# Patient Record
Sex: FEMALE | Race: WHITE
[De-identification: names, ages, dates, MRNs, and addresses within clinical notes are randomized per-mention and may not be internally consistent; named-entity substitution may affect disease eponyms.]

---

## 2017-03-30 ENCOUNTER — HOSPITAL ENCOUNTER (OUTPATIENT)
Dept: HOSPITAL 19 - LDRO | Age: 27
End: 2017-03-30
Attending: OBSTETRICS & GYNECOLOGY
Payer: COMMERCIAL

## 2017-03-30 DIAGNOSIS — Z53.9: Primary | ICD-10-CM

## 2017-05-27 ENCOUNTER — HOSPITAL ENCOUNTER (OUTPATIENT)
Dept: HOSPITAL 19 - LDRO | Age: 27
LOS: 1 days | Discharge: HOME | End: 2017-05-28
Attending: OBSTETRICS & GYNECOLOGY
Payer: COMMERCIAL

## 2017-05-27 VITALS — TEMPERATURE: 98.4 F | DIASTOLIC BLOOD PRESSURE: 85 MMHG | SYSTOLIC BLOOD PRESSURE: 120 MMHG | HEART RATE: 67 BPM

## 2017-05-27 VITALS — SYSTOLIC BLOOD PRESSURE: 120 MMHG | DIASTOLIC BLOOD PRESSURE: 85 MMHG | HEART RATE: 67 BPM | TEMPERATURE: 98.4 F

## 2017-05-27 VITALS — HEIGHT: 64.02 IN | WEIGHT: 153.22 LBS | BODY MASS INDEX: 26.16 KG/M2

## 2017-05-27 DIAGNOSIS — Z3A.31: ICD-10-CM

## 2017-05-28 ENCOUNTER — HOSPITAL ENCOUNTER (INPATIENT)
Dept: HOSPITAL 19 - LDRO | Age: 27
LOS: 2 days | Discharge: HOME | End: 2017-05-30
Attending: OBSTETRICS & GYNECOLOGY | Admitting: OBSTETRICS & GYNECOLOGY
Payer: COMMERCIAL

## 2017-05-28 VITALS — HEART RATE: 100 BPM | SYSTOLIC BLOOD PRESSURE: 125 MMHG | DIASTOLIC BLOOD PRESSURE: 77 MMHG

## 2017-05-28 VITALS — HEART RATE: 83 BPM | DIASTOLIC BLOOD PRESSURE: 76 MMHG | SYSTOLIC BLOOD PRESSURE: 131 MMHG

## 2017-05-28 VITALS — DIASTOLIC BLOOD PRESSURE: 57 MMHG | SYSTOLIC BLOOD PRESSURE: 118 MMHG | HEART RATE: 76 BPM

## 2017-05-28 VITALS — TEMPERATURE: 97.8 F | DIASTOLIC BLOOD PRESSURE: 70 MMHG | SYSTOLIC BLOOD PRESSURE: 114 MMHG | HEART RATE: 77 BPM

## 2017-05-28 VITALS — DIASTOLIC BLOOD PRESSURE: 60 MMHG | HEART RATE: 75 BPM | SYSTOLIC BLOOD PRESSURE: 126 MMHG

## 2017-05-28 VITALS — DIASTOLIC BLOOD PRESSURE: 77 MMHG | HEART RATE: 98 BPM | SYSTOLIC BLOOD PRESSURE: 129 MMHG

## 2017-05-28 VITALS — HEART RATE: 85 BPM | SYSTOLIC BLOOD PRESSURE: 140 MMHG | DIASTOLIC BLOOD PRESSURE: 95 MMHG

## 2017-05-28 VITALS — HEART RATE: 71 BPM | SYSTOLIC BLOOD PRESSURE: 128 MMHG | DIASTOLIC BLOOD PRESSURE: 60 MMHG

## 2017-05-28 VITALS — HEART RATE: 91 BPM | SYSTOLIC BLOOD PRESSURE: 117 MMHG | DIASTOLIC BLOOD PRESSURE: 75 MMHG

## 2017-05-28 VITALS — HEART RATE: 88 BPM | SYSTOLIC BLOOD PRESSURE: 107 MMHG | DIASTOLIC BLOOD PRESSURE: 65 MMHG

## 2017-05-28 VITALS — HEART RATE: 78 BPM | SYSTOLIC BLOOD PRESSURE: 129 MMHG | DIASTOLIC BLOOD PRESSURE: 79 MMHG

## 2017-05-28 VITALS — HEART RATE: 90 BPM | DIASTOLIC BLOOD PRESSURE: 72 MMHG | SYSTOLIC BLOOD PRESSURE: 119 MMHG

## 2017-05-28 VITALS — SYSTOLIC BLOOD PRESSURE: 122 MMHG | HEART RATE: 97 BPM | DIASTOLIC BLOOD PRESSURE: 73 MMHG

## 2017-05-28 VITALS — SYSTOLIC BLOOD PRESSURE: 107 MMHG | HEART RATE: 81 BPM | DIASTOLIC BLOOD PRESSURE: 60 MMHG

## 2017-05-28 VITALS — HEART RATE: 102 BPM | DIASTOLIC BLOOD PRESSURE: 81 MMHG | SYSTOLIC BLOOD PRESSURE: 130 MMHG

## 2017-05-28 VITALS — SYSTOLIC BLOOD PRESSURE: 135 MMHG | HEART RATE: 84 BPM | DIASTOLIC BLOOD PRESSURE: 58 MMHG

## 2017-05-28 VITALS — DIASTOLIC BLOOD PRESSURE: 58 MMHG | HEART RATE: 82 BPM | SYSTOLIC BLOOD PRESSURE: 116 MMHG

## 2017-05-28 VITALS — DIASTOLIC BLOOD PRESSURE: 73 MMHG | HEART RATE: 83 BPM | SYSTOLIC BLOOD PRESSURE: 124 MMHG | TEMPERATURE: 98 F

## 2017-05-28 VITALS — SYSTOLIC BLOOD PRESSURE: 122 MMHG | HEART RATE: 87 BPM | DIASTOLIC BLOOD PRESSURE: 71 MMHG

## 2017-05-28 VITALS — DIASTOLIC BLOOD PRESSURE: 77 MMHG | HEART RATE: 91 BPM | SYSTOLIC BLOOD PRESSURE: 130 MMHG

## 2017-05-28 VITALS — HEART RATE: 81 BPM | SYSTOLIC BLOOD PRESSURE: 126 MMHG | DIASTOLIC BLOOD PRESSURE: 85 MMHG | TEMPERATURE: 97.8 F

## 2017-05-28 VITALS — SYSTOLIC BLOOD PRESSURE: 129 MMHG | HEART RATE: 88 BPM | DIASTOLIC BLOOD PRESSURE: 79 MMHG

## 2017-05-28 VITALS — DIASTOLIC BLOOD PRESSURE: 90 MMHG | SYSTOLIC BLOOD PRESSURE: 140 MMHG | HEART RATE: 118 BPM | TEMPERATURE: 98 F

## 2017-05-28 VITALS — TEMPERATURE: 98.9 F | DIASTOLIC BLOOD PRESSURE: 69 MMHG | HEART RATE: 74 BPM | SYSTOLIC BLOOD PRESSURE: 128 MMHG

## 2017-05-28 VITALS — DIASTOLIC BLOOD PRESSURE: 80 MMHG | SYSTOLIC BLOOD PRESSURE: 132 MMHG | HEART RATE: 97 BPM

## 2017-05-28 VITALS — DIASTOLIC BLOOD PRESSURE: 66 MMHG | SYSTOLIC BLOOD PRESSURE: 106 MMHG | HEART RATE: 81 BPM

## 2017-05-28 VITALS — HEART RATE: 73 BPM | SYSTOLIC BLOOD PRESSURE: 107 MMHG | DIASTOLIC BLOOD PRESSURE: 60 MMHG

## 2017-05-28 VITALS — BODY MASS INDEX: 26.16 KG/M2 | HEIGHT: 64.02 IN | WEIGHT: 153.22 LBS

## 2017-05-28 VITALS — DIASTOLIC BLOOD PRESSURE: 86 MMHG | HEART RATE: 88 BPM | SYSTOLIC BLOOD PRESSURE: 140 MMHG

## 2017-05-28 VITALS — HEART RATE: 90 BPM | DIASTOLIC BLOOD PRESSURE: 72 MMHG | SYSTOLIC BLOOD PRESSURE: 180 MMHG

## 2017-05-28 VITALS — SYSTOLIC BLOOD PRESSURE: 117 MMHG | HEART RATE: 63 BPM | DIASTOLIC BLOOD PRESSURE: 71 MMHG | TEMPERATURE: 97.9 F

## 2017-05-28 VITALS — HEART RATE: 79 BPM | DIASTOLIC BLOOD PRESSURE: 59 MMHG | SYSTOLIC BLOOD PRESSURE: 104 MMHG

## 2017-05-28 VITALS — DIASTOLIC BLOOD PRESSURE: 66 MMHG | SYSTOLIC BLOOD PRESSURE: 103 MMHG | HEART RATE: 92 BPM

## 2017-05-28 VITALS — DIASTOLIC BLOOD PRESSURE: 73 MMHG | HEART RATE: 101 BPM | SYSTOLIC BLOOD PRESSURE: 138 MMHG

## 2017-05-28 VITALS — SYSTOLIC BLOOD PRESSURE: 120 MMHG | HEART RATE: 82 BPM | DIASTOLIC BLOOD PRESSURE: 73 MMHG

## 2017-05-28 VITALS — SYSTOLIC BLOOD PRESSURE: 103 MMHG | DIASTOLIC BLOOD PRESSURE: 58 MMHG | HEART RATE: 81 BPM

## 2017-05-28 VITALS — HEART RATE: 93 BPM | SYSTOLIC BLOOD PRESSURE: 128 MMHG | DIASTOLIC BLOOD PRESSURE: 77 MMHG

## 2017-05-28 VITALS — DIASTOLIC BLOOD PRESSURE: 78 MMHG | HEART RATE: 96 BPM | SYSTOLIC BLOOD PRESSURE: 114 MMHG

## 2017-05-28 VITALS — SYSTOLIC BLOOD PRESSURE: 129 MMHG | HEART RATE: 90 BPM | DIASTOLIC BLOOD PRESSURE: 82 MMHG

## 2017-05-28 VITALS — DIASTOLIC BLOOD PRESSURE: 76 MMHG | SYSTOLIC BLOOD PRESSURE: 126 MMHG | HEART RATE: 122 BPM

## 2017-05-28 DIAGNOSIS — Z3A.39: ICD-10-CM

## 2017-05-28 LAB
BASOPHILS # BLD: 0.1 10*3/UL (ref 0–0.2)
BASOPHILS NFR BLD AUTO: 0.4 % (ref 0–2)
EOSINOPHIL # BLD: 0 10*3/UL (ref 0–0.7)
EOSINOPHIL NFR BLD: 0.2 % (ref 0–4)
ERYTHROCYTE [DISTWIDTH] IN BLOOD BY AUTOMATED COUNT: 13.2 % (ref 11.5–14.5)
GRANULOCYTES # BLD AUTO: 81.1 % (ref 42.2–75.2)
HCT VFR BLD AUTO: 40.8 % (ref 37–47)
HGB BLD-MCNC: 14.2 G/DL (ref 12.5–16)
LYMPHOCYTES # BLD: 1.9 10*3/UL (ref 1.2–3.4)
LYMPHOCYTES NFR BLD: 12 % (ref 20–51)
MCH RBC QN AUTO: 32 PG (ref 27–31)
MCHC RBC AUTO-ENTMCNC: 35 G/DL (ref 33–37)
MCV RBC AUTO: 92 FL (ref 80–100)
MONOCYTES # BLD: 0.8 10*3/UL (ref 0.1–0.6)
MONOCYTES NFR BLD AUTO: 5.4 % (ref 1.7–9.3)
NEUTROPHILS # BLD: 12.7 10*3/UL (ref 1.4–6.5)
PLATELET # BLD AUTO: 234 K/MM3 (ref 130–400)
PMV BLD AUTO: 9.8 FL (ref 7.4–10.4)
RBC # BLD AUTO: 4.46 M/MM3 (ref 4.1–5.3)
WBC # BLD AUTO: 15.7 K/MM3 (ref 4.8–10.8)

## 2017-05-29 VITALS — DIASTOLIC BLOOD PRESSURE: 76 MMHG | TEMPERATURE: 97.3 F | SYSTOLIC BLOOD PRESSURE: 114 MMHG | HEART RATE: 75 BPM

## 2017-05-29 VITALS — HEART RATE: 72 BPM | SYSTOLIC BLOOD PRESSURE: 113 MMHG | TEMPERATURE: 97.6 F | DIASTOLIC BLOOD PRESSURE: 75 MMHG

## 2017-05-29 VITALS — TEMPERATURE: 97.7 F | DIASTOLIC BLOOD PRESSURE: 62 MMHG | HEART RATE: 74 BPM | SYSTOLIC BLOOD PRESSURE: 108 MMHG

## 2017-05-29 VITALS — HEART RATE: 74 BPM | SYSTOLIC BLOOD PRESSURE: 118 MMHG | DIASTOLIC BLOOD PRESSURE: 71 MMHG | TEMPERATURE: 97.8 F

## 2017-05-30 VITALS — DIASTOLIC BLOOD PRESSURE: 66 MMHG | HEART RATE: 94 BPM | SYSTOLIC BLOOD PRESSURE: 91 MMHG | TEMPERATURE: 97.5 F

## 2018-12-21 ENCOUNTER — HOSPITAL ENCOUNTER (OUTPATIENT)
Dept: HOSPITAL 19 - LDRO | Age: 28
LOS: 82 days | Discharge: HOME | End: 2019-03-13
Attending: OBSTETRICS & GYNECOLOGY
Payer: COMMERCIAL

## 2018-12-21 VITALS — HEIGHT: 63 IN | WEIGHT: 182 LBS | BODY MASS INDEX: 32.25 KG/M2

## 2018-12-21 DIAGNOSIS — Z3A.35: ICD-10-CM

## 2018-12-21 DIAGNOSIS — O30.003: Primary | ICD-10-CM

## 2018-12-31 NOTE — NUR
2008- EFM DC'D AT THIS TIME FOLLOWING REACTIVE FHT'S FOR BOTH BABY A AND B.
MOTHER STATES GOOD FM AND FM AUDIBLE ON US. PT STATES SHE IS TO HAVE
APPOINTMENT WITH DR. RUST THIS WEEK, WILL DISCUSS WITH HIM WHEN NEXT NST IS
NEEDED. QUESTIONS ENCOURAGED AND ANSWERED.
2015- PT LEFT THE UNIT AMBULATORY WITH SPOUSE AND COUSIN.

## 2019-01-03 ENCOUNTER — HOSPITAL ENCOUNTER (INPATIENT)
Dept: HOSPITAL 19 - LDRO | Age: 29
LOS: 4 days | Discharge: HOME | End: 2019-01-07
Attending: OBSTETRICS & GYNECOLOGY | Admitting: OBSTETRICS & GYNECOLOGY
Payer: COMMERCIAL

## 2019-01-03 VITALS — DIASTOLIC BLOOD PRESSURE: 75 MMHG | SYSTOLIC BLOOD PRESSURE: 135 MMHG | HEART RATE: 117 BPM

## 2019-01-03 VITALS — DIASTOLIC BLOOD PRESSURE: 73 MMHG | HEART RATE: 113 BPM | SYSTOLIC BLOOD PRESSURE: 122 MMHG

## 2019-01-03 VITALS — DIASTOLIC BLOOD PRESSURE: 74 MMHG | SYSTOLIC BLOOD PRESSURE: 128 MMHG | HEART RATE: 123 BPM

## 2019-01-03 VITALS — DIASTOLIC BLOOD PRESSURE: 59 MMHG | HEART RATE: 123 BPM | SYSTOLIC BLOOD PRESSURE: 111 MMHG

## 2019-01-03 VITALS — DIASTOLIC BLOOD PRESSURE: 84 MMHG | SYSTOLIC BLOOD PRESSURE: 134 MMHG | HEART RATE: 108 BPM | TEMPERATURE: 98.1 F

## 2019-01-03 VITALS — HEART RATE: 83 BPM | DIASTOLIC BLOOD PRESSURE: 90 MMHG | TEMPERATURE: 98 F | SYSTOLIC BLOOD PRESSURE: 129 MMHG

## 2019-01-03 VITALS — HEART RATE: 115 BPM | DIASTOLIC BLOOD PRESSURE: 68 MMHG | SYSTOLIC BLOOD PRESSURE: 130 MMHG

## 2019-01-03 VITALS — DIASTOLIC BLOOD PRESSURE: 78 MMHG | SYSTOLIC BLOOD PRESSURE: 132 MMHG | TEMPERATURE: 98.5 F | HEART RATE: 142 BPM

## 2019-01-03 VITALS — SYSTOLIC BLOOD PRESSURE: 122 MMHG | HEART RATE: 116 BPM | DIASTOLIC BLOOD PRESSURE: 72 MMHG

## 2019-01-03 VITALS — HEART RATE: 111 BPM | SYSTOLIC BLOOD PRESSURE: 115 MMHG | TEMPERATURE: 99.1 F | DIASTOLIC BLOOD PRESSURE: 68 MMHG

## 2019-01-03 VITALS — DIASTOLIC BLOOD PRESSURE: 85 MMHG | HEART RATE: 106 BPM | SYSTOLIC BLOOD PRESSURE: 124 MMHG

## 2019-01-03 VITALS — HEART RATE: 115 BPM | DIASTOLIC BLOOD PRESSURE: 75 MMHG | SYSTOLIC BLOOD PRESSURE: 97 MMHG

## 2019-01-03 VITALS — HEART RATE: 129 BPM | SYSTOLIC BLOOD PRESSURE: 115 MMHG | DIASTOLIC BLOOD PRESSURE: 68 MMHG

## 2019-01-03 VITALS — HEART RATE: 111 BPM | DIASTOLIC BLOOD PRESSURE: 81 MMHG | SYSTOLIC BLOOD PRESSURE: 117 MMHG

## 2019-01-03 VITALS — DIASTOLIC BLOOD PRESSURE: 79 MMHG | HEART RATE: 82 BPM | TEMPERATURE: 98 F | SYSTOLIC BLOOD PRESSURE: 118 MMHG

## 2019-01-03 VITALS — SYSTOLIC BLOOD PRESSURE: 140 MMHG | HEART RATE: 109 BPM | TEMPERATURE: 97.4 F | DIASTOLIC BLOOD PRESSURE: 91 MMHG

## 2019-01-03 VITALS — DIASTOLIC BLOOD PRESSURE: 78 MMHG | HEART RATE: 100 BPM | SYSTOLIC BLOOD PRESSURE: 131 MMHG

## 2019-01-03 VITALS — TEMPERATURE: 99.2 F | HEART RATE: 108 BPM | SYSTOLIC BLOOD PRESSURE: 112 MMHG | DIASTOLIC BLOOD PRESSURE: 67 MMHG

## 2019-01-03 VITALS — DIASTOLIC BLOOD PRESSURE: 79 MMHG | SYSTOLIC BLOOD PRESSURE: 122 MMHG | HEART RATE: 103 BPM

## 2019-01-03 VITALS — DIASTOLIC BLOOD PRESSURE: 84 MMHG | HEART RATE: 95 BPM | SYSTOLIC BLOOD PRESSURE: 132 MMHG

## 2019-01-03 VITALS — HEART RATE: 121 BPM | DIASTOLIC BLOOD PRESSURE: 73 MMHG | SYSTOLIC BLOOD PRESSURE: 125 MMHG

## 2019-01-03 VITALS — DIASTOLIC BLOOD PRESSURE: 81 MMHG | SYSTOLIC BLOOD PRESSURE: 123 MMHG | HEART RATE: 122 BPM

## 2019-01-03 VITALS — HEART RATE: 115 BPM | SYSTOLIC BLOOD PRESSURE: 133 MMHG | DIASTOLIC BLOOD PRESSURE: 73 MMHG

## 2019-01-03 VITALS — HEART RATE: 130 BPM | DIASTOLIC BLOOD PRESSURE: 61 MMHG | SYSTOLIC BLOOD PRESSURE: 109 MMHG

## 2019-01-03 VITALS — SYSTOLIC BLOOD PRESSURE: 126 MMHG | DIASTOLIC BLOOD PRESSURE: 79 MMHG | HEART RATE: 122 BPM

## 2019-01-03 VITALS — HEART RATE: 90 BPM | TEMPERATURE: 97.4 F | DIASTOLIC BLOOD PRESSURE: 74 MMHG | SYSTOLIC BLOOD PRESSURE: 119 MMHG

## 2019-01-03 VITALS — HEART RATE: 111 BPM | SYSTOLIC BLOOD PRESSURE: 127 MMHG | DIASTOLIC BLOOD PRESSURE: 88 MMHG

## 2019-01-03 VITALS — DIASTOLIC BLOOD PRESSURE: 71 MMHG | SYSTOLIC BLOOD PRESSURE: 122 MMHG | HEART RATE: 116 BPM

## 2019-01-03 VITALS — SYSTOLIC BLOOD PRESSURE: 125 MMHG | DIASTOLIC BLOOD PRESSURE: 75 MMHG | HEART RATE: 113 BPM

## 2019-01-03 VITALS — HEART RATE: 107 BPM | SYSTOLIC BLOOD PRESSURE: 115 MMHG | DIASTOLIC BLOOD PRESSURE: 70 MMHG

## 2019-01-03 VITALS — DIASTOLIC BLOOD PRESSURE: 75 MMHG | HEART RATE: 92 BPM | TEMPERATURE: 98.2 F | SYSTOLIC BLOOD PRESSURE: 135 MMHG

## 2019-01-03 VITALS — HEART RATE: 115 BPM | DIASTOLIC BLOOD PRESSURE: 71 MMHG | SYSTOLIC BLOOD PRESSURE: 123 MMHG

## 2019-01-03 VITALS — HEART RATE: 107 BPM | SYSTOLIC BLOOD PRESSURE: 131 MMHG | DIASTOLIC BLOOD PRESSURE: 82 MMHG

## 2019-01-03 VITALS — SYSTOLIC BLOOD PRESSURE: 131 MMHG | DIASTOLIC BLOOD PRESSURE: 82 MMHG | HEART RATE: 107 BPM

## 2019-01-03 VITALS — HEART RATE: 100 BPM | SYSTOLIC BLOOD PRESSURE: 122 MMHG | DIASTOLIC BLOOD PRESSURE: 82 MMHG

## 2019-01-03 VITALS — HEART RATE: 120 BPM | SYSTOLIC BLOOD PRESSURE: 123 MMHG | DIASTOLIC BLOOD PRESSURE: 72 MMHG

## 2019-01-03 VITALS — SYSTOLIC BLOOD PRESSURE: 124 MMHG | HEART RATE: 121 BPM | DIASTOLIC BLOOD PRESSURE: 74 MMHG

## 2019-01-03 DIAGNOSIS — O30.033: Primary | ICD-10-CM

## 2019-01-03 DIAGNOSIS — Z3A.36: ICD-10-CM

## 2019-01-03 LAB
DACRYOCYTES BLD QL SMEAR: (no result)
ERYTHROCYTE [DISTWIDTH] IN BLOOD BY AUTOMATED COUNT: 14.1 % (ref 11.5–14.5)
HCT VFR BLD AUTO: 39.8 % (ref 37–47)
HGB BLD-MCNC: 13.5 G/DL (ref 12.5–16)
LYMPHOCYTES NFR BLD MANUAL: 3 % (ref 20–51)
MCH RBC QN AUTO: 32 PG (ref 27–31)
MCHC RBC AUTO-ENTMCNC: 34 G/DL (ref 33–37)
MCV RBC AUTO: 95 FL (ref 80–100)
MONOCYTES NFR BLD: 1 % (ref 1.7–9.3)
NEUTS BAND NFR BLD: 3 % (ref 0–10)
NEUTS SEG NFR BLD MANUAL: 93 % (ref 42–75.2)
PLATELET # BLD AUTO: 190 K/MM3 (ref 130–400)
PLATELET BLD QL SMEAR: NORMAL
PMV BLD AUTO: 11.5 FL (ref 7.4–10.4)
RBC # BLD AUTO: 4.18 M/MM3 (ref 4.1–5.3)

## 2019-01-03 NOTE — NUR
0810: Patient sitting up for epidural placement.
0815: Lidocaine.
0818: Epidural Catheter.
0820: Test Dose given, no adverse reactions noted.

## 2019-01-03 NOTE — NUR
Patient reports having one spell of nausea since Zofran but feeling better
overall. Percocet given, see eMAR. VS taken. Fundus firm, lochia WNL.
Crackers provided. Pericare performed, underwear and peripad in place. Gown
changed. Patient transferred to wheelchair, then into nursery to visit the
twins.

## 2019-01-03 NOTE — NUR
SVE UNCHANGED.
0229:  updated on pts status. Orders received. See physican
notification.
0244: IV started and LR bolus infusing without difficulties. Plan of care
explained to pt and  who verbalize understanding.
0303: baby A FHR tracing materal heart rate. 0310: Pulse ox applied and EFM
adjusted.
0511: Pt off monitors to void.
[id number]: Pt assisted on birthing ball. FHR tracing maternal and pulse ox
applied. RN at pts side and adjusting monitors.
0600: SVE 4-5/80/-3 per DANII Sanders.
0605:  updated on pts status. Admit orders received.

## 2019-01-03 NOTE — NUR
at bedside. Plan of care discussed with patient and . AROM
at 0833, moderate amount of clear fluid noted. SVE 5-6/80/-1 per provider.

## 2019-01-03 NOTE — NUR
0955:  at bedside. SVE unchanged per provider. Patient repositioned
to left lateral at 1000. Provider remains at bedside reviewing strip and
discussing plan of care.
1008: Order to start Pitocin at 1mu/min and begin amnioinfusion per
protocol. O2 on via face mask per  orders. Provider remains at
bedside reviewing strip.
1010: IUPC placed. Attempt to begin amnioinfusion per protocol but IV pump
continues to report error of distal occlusion.
1020: IUPC replaced by , amnioinfusion started per protocol. Patient
repositioned to right lateral.
1023: FHR 90-100s with deep variable decelerations with
contractions continuing. SVE unchanged per . Pitocin off.
Amnioinfusion stopped. Provider remains at bedside reviewing strip.
1025: Decision for  section made.
1030: Patient to OR via bed.

## 2019-01-03 NOTE — NUR
G2L1. 36-2. Pt ambulatory to LDR 2 with spouse. Clean gown on. EFM and TOCO
explained and applied. Twins noted and both tracing well. Pt states her
contractions started around 1900 tonight and were irregular but have started
to increase and become more regular. Denies LOF or vaginal bleeding. Reports
good fetal movement. SVE 1-2/60/-3. Vital signs and assessment completed. Pt
oriented to room. Call light within reach. Will continue to monitor.

## 2019-01-03 NOTE — NUR
to bedside for SVE. SVE 5/80/-3 per provider. Plan of care discussed,
questions answered. Patient reports pain with contractions but denies need
for epidural at this time. Call light within reach.

## 2019-01-03 NOTE — NUR
at bedside. Vertex/vertex positioning verified via ultrasound. Plan
of care discussed, questions answered. Patient requesting epidural placement.

## 2019-01-03 NOTE — NUR
at bedside, remains on unit reviewing strip. SVE 7/90/-1 per
provider at 0930. Shel[place] catheter placed at 0945, pericare performed.
Attempted to reposition patient to left tilt, but variable deceleration noted
to be deeper and longer in that position. Patient back to right tilt with HOB
elevated.

## 2019-01-03 NOTE — NUR
Patient reports sleeping over the last hour, states she feels better. Vital
signs taken, WNL. Fundus firm, lochia WNL. States right leg is still heavy,
unable to lift/hold off of bed. Urine output meeting minimum requirement -
30ml per hour. Will continue to monitor. Encouraged to rest again. Call light
within reach.

## 2019-01-03 NOTE — NUR
Epidural Catheter removed. Pericare performed. Fundus firm, lochia WNL.
Patient has had a light diet, reports waves of nausea. Declines Zofran.
Encouraged to rest.  at bedside. Call light within reach.

## 2019-01-03 NOTE — NUR
Patient into Room 211 via bed. Oriented to room and plan of care. Crackers and
juice provided. Denies needs.

## 2019-01-04 VITALS — HEART RATE: 88 BPM | DIASTOLIC BLOOD PRESSURE: 79 MMHG | TEMPERATURE: 98.2 F | SYSTOLIC BLOOD PRESSURE: 122 MMHG

## 2019-01-04 VITALS — SYSTOLIC BLOOD PRESSURE: 101 MMHG | HEART RATE: 83 BPM | TEMPERATURE: 97.5 F | DIASTOLIC BLOOD PRESSURE: 80 MMHG

## 2019-01-04 VITALS — DIASTOLIC BLOOD PRESSURE: 86 MMHG | HEART RATE: 97 BPM | TEMPERATURE: 97.8 F | SYSTOLIC BLOOD PRESSURE: 130 MMHG

## 2019-01-04 VITALS — TEMPERATURE: 97.8 F | DIASTOLIC BLOOD PRESSURE: 82 MMHG | SYSTOLIC BLOOD PRESSURE: 127 MMHG | HEART RATE: 88 BPM

## 2019-01-04 VITALS — HEART RATE: 83 BPM | TEMPERATURE: 97.7 F | DIASTOLIC BLOOD PRESSURE: 80 MMHG | SYSTOLIC BLOOD PRESSURE: 117 MMHG

## 2019-01-04 NOTE — NUR
Initial visit; Parents thanked  for offering congratulations for the
birth of their twin girls and for thanking [facility] for choosing [facility]

## 2019-01-04 NOTE — NUR
Initial visit; Parents thanked  for offering congratulations for the
birth of their twin girls.  thanked them for choosing [facility].

## 2019-01-05 VITALS — TEMPERATURE: 97.5 F | HEART RATE: 79 BPM | DIASTOLIC BLOOD PRESSURE: 77 MMHG | SYSTOLIC BLOOD PRESSURE: 113 MMHG

## 2019-01-05 VITALS — SYSTOLIC BLOOD PRESSURE: 134 MMHG | TEMPERATURE: 98.3 F | HEART RATE: 83 BPM | DIASTOLIC BLOOD PRESSURE: 85 MMHG

## 2019-01-05 VITALS — HEART RATE: 80 BPM | DIASTOLIC BLOOD PRESSURE: 76 MMHG | SYSTOLIC BLOOD PRESSURE: 109 MMHG | TEMPERATURE: 97.5 F

## 2019-01-06 VITALS — HEART RATE: 83 BPM | SYSTOLIC BLOOD PRESSURE: 130 MMHG | DIASTOLIC BLOOD PRESSURE: 93 MMHG | TEMPERATURE: 97.8 F

## 2019-01-06 VITALS — HEART RATE: 80 BPM | SYSTOLIC BLOOD PRESSURE: 128 MMHG | TEMPERATURE: 97.4 F | DIASTOLIC BLOOD PRESSURE: 81 MMHG

## 2019-01-06 VITALS — DIASTOLIC BLOOD PRESSURE: 85 MMHG | SYSTOLIC BLOOD PRESSURE: 125 MMHG | HEART RATE: 73 BPM | TEMPERATURE: 97.6 F

## 2019-01-07 VITALS — DIASTOLIC BLOOD PRESSURE: 80 MMHG | HEART RATE: 76 BPM | SYSTOLIC BLOOD PRESSURE: 130 MMHG | TEMPERATURE: 97.1 F
